# Patient Record
Sex: FEMALE | Race: WHITE | NOT HISPANIC OR LATINO
[De-identification: names, ages, dates, MRNs, and addresses within clinical notes are randomized per-mention and may not be internally consistent; named-entity substitution may affect disease eponyms.]

---

## 2020-01-02 ENCOUNTER — APPOINTMENT (OUTPATIENT)
Dept: PLASTIC SURGERY | Facility: CLINIC | Age: 65
End: 2020-01-02
Payer: COMMERCIAL

## 2020-01-02 VITALS — HEART RATE: 73 BPM | DIASTOLIC BLOOD PRESSURE: 76 MMHG | OXYGEN SATURATION: 97 % | SYSTOLIC BLOOD PRESSURE: 143 MMHG

## 2020-01-02 PROBLEM — Z00.00 ENCOUNTER FOR PREVENTIVE HEALTH EXAMINATION: Status: ACTIVE | Noted: 2020-01-02

## 2020-01-02 PROCEDURE — 99203 OFFICE O/P NEW LOW 30 MIN: CPT

## 2020-01-02 NOTE — HISTORY OF PRESENT ILLNESS
[FreeTextEntry1] : Although the right upper neck mass has been present for about two years, patient notes a recent increase in size.  She denies any history of trauma or infection, She has no history of skin cancer, and no history of prior cysts

## 2020-01-02 NOTE — PHYSICAL EXAM
[NI] : Normal [de-identified] : 1.2 cm firm mobile subcutaneous mass right upper neck\par No surrounding erythema or purulence \par No palpable adenopathy

## 2020-01-02 NOTE — ASSESSMENT
[FreeTextEntry1] : A:\par Epidermal cyst, right upper neck, increasing in size\par P:\par We discussed the treatment options and favor excisional biopsy while the lesion remains relatively small.  Reviewed the material risks,  benefits,  and alternatives with Ms. SANNA KU  , including no surgery, and she  understands and wishes to proceed.\par

## 2020-01-02 NOTE — REVIEW OF SYSTEMS
[Nasal Discharge] : nasal discharge [Emotional Problems] : emotional problems [Negative] : Heme/Lymph [FreeTextEntry4] : sinus pressure [de-identified] : seizures [de-identified] : thyroid problems

## 2020-01-17 ENCOUNTER — APPOINTMENT (OUTPATIENT)
Dept: PLASTIC SURGERY | Facility: CLINIC | Age: 65
End: 2020-01-17
Payer: COMMERCIAL

## 2020-01-17 VITALS
TEMPERATURE: 97.4 F | SYSTOLIC BLOOD PRESSURE: 139 MMHG | HEART RATE: 99 BPM | DIASTOLIC BLOOD PRESSURE: 81 MMHG | RESPIRATION RATE: 20 BRPM | OXYGEN SATURATION: 98 %

## 2020-01-17 PROCEDURE — 11442 EXC FACE-MM B9+MARG 1.1-2 CM: CPT

## 2020-01-17 PROCEDURE — 12051 INTMD RPR FACE/MM 2.5 CM/<: CPT

## 2020-01-17 NOTE — PROCEDURE
[FreeTextEntry1] : cystic lesion in the right upper neck  [FreeTextEntry2] : excisional biopsy of a cystic lesion in the right upper neck  [FreeTextEntry3] : Xylocaine 1% with epinephrine  [FreeTextEntry6] : The lesion was marked for excision with the patient in agreement, and following a sterile prep and drape, Xylocaine with epinephrine was injected for local anesthesia.  Incision was made over the mass which was then  from surrounding tissue and excised.  Hemostasis was assured and the incision closed with nylon sutures.  Patient tolerated well  [FreeTextEntry4] : minimal  [FreeTextEntry5] : none  [FreeTextEntry7] : cystic lesion, neck

## 2020-01-17 NOTE — REASON FOR VISIT
[Procedure: _________] : a [unfilled] procedure visit [FreeTextEntry1] : Patient returns for excisional biopsy of a cystic lesion in the right upper neck

## 2020-01-17 NOTE — ASSESSMENT
[FreeTextEntry1] : A:\par Cystic lesion in the right upper neck \par P:\par Excisional biopsy of a cystic lesion in the right upper neck \par Tolerated well\par Instructions reviewed

## 2020-01-22 ENCOUNTER — APPOINTMENT (OUTPATIENT)
Dept: PLASTIC SURGERY | Facility: CLINIC | Age: 65
End: 2020-01-22
Payer: COMMERCIAL

## 2020-01-22 PROCEDURE — 99024 POSTOP FOLLOW-UP VISIT: CPT

## 2020-01-22 NOTE — REASON FOR VISIT
[FreeTextEntry1] : Ms. SANNA KU  returns for suture removal, generally doing well with no complaints and no fevers or chills at home.  [Post Op: _________] : a [unfilled] post op visit

## 2020-01-22 NOTE — ASSESSMENT
[FreeTextEntry1] : A:\par Doing well post operative \par P;\par Sutures removed and scar care reviewed\par Pathology is pending will call with results

## 2021-01-13 ENCOUNTER — APPOINTMENT (OUTPATIENT)
Dept: PLASTIC SURGERY | Facility: CLINIC | Age: 66
End: 2021-01-13

## 2021-02-19 ENCOUNTER — APPOINTMENT (OUTPATIENT)
Dept: PLASTIC SURGERY | Facility: CLINIC | Age: 66
End: 2021-02-19
Payer: COMMERCIAL

## 2021-02-19 VITALS
TEMPERATURE: 97.8 F | OXYGEN SATURATION: 97 % | SYSTOLIC BLOOD PRESSURE: 142 MMHG | RESPIRATION RATE: 20 BRPM | HEART RATE: 82 BPM | DIASTOLIC BLOOD PRESSURE: 81 MMHG

## 2021-02-19 PROCEDURE — 99212 OFFICE O/P EST SF 10 MIN: CPT

## 2021-02-19 PROCEDURE — 99072 ADDL SUPL MATRL&STAF TM PHE: CPT

## 2021-02-19 NOTE — PHYSICAL EXAM
[de-identified] : 1 cm fimnr mobile mass right mid neck\par no surrounding erythema or purulence\par no palpable adenopathy

## 2021-02-19 NOTE — HISTORY OF PRESENT ILLNESS
[FreeTextEntry1] : Epidermal inclusion cyst excised at the site about 13 months ago, noted recurrent about 6 months ago, held off return due to Covid

## 2021-02-19 NOTE — REASON FOR VISIT
[Consultation] : a consultation visit [FreeTextEntry1] : Patient returns to discuss treatment of a recurrent cystic mass on the right side of the neck

## 2021-02-19 NOTE — ASSESSMENT
[FreeTextEntry1] : A:\par Cystic mass right neck\par P:\par We discussed the treatment options and favor excisional biopsy.  Reviewed the material risks,  benefits,  and alternatives with Ms. SANNA KU  , including no surgery, and she  understands and wishes to proceed.\par

## 2021-03-04 ENCOUNTER — APPOINTMENT (OUTPATIENT)
Dept: PLASTIC SURGERY | Facility: CLINIC | Age: 66
End: 2021-03-04
Payer: COMMERCIAL

## 2021-03-04 VITALS
SYSTOLIC BLOOD PRESSURE: 163 MMHG | DIASTOLIC BLOOD PRESSURE: 84 MMHG | OXYGEN SATURATION: 96 % | HEART RATE: 82 BPM | TEMPERATURE: 98.1 F | RESPIRATION RATE: 19 BRPM

## 2021-03-04 PROCEDURE — 11422 EXC H-F-NK-SP B9+MARG 1.1-2: CPT

## 2021-03-04 PROCEDURE — 12031 INTMD RPR S/A/T/EXT 2.5 CM/<: CPT

## 2021-03-04 PROCEDURE — 99072 ADDL SUPL MATRL&STAF TM PHE: CPT

## 2021-03-04 NOTE — ASSESSMENT
[FreeTextEntry1] : A:\par Cystic mass right neck\par P:\par Excisional biopsy\par closure of 1.6 cm skin defect\par Patient tolerated well\par Instructions reviewed

## 2021-03-04 NOTE — REASON FOR VISIT
[Procedure: _________] : a [unfilled] procedure visit [FreeTextEntry1] : Patient returns for re-excision of the cystic lesion on the right neck

## 2021-03-04 NOTE — PROCEDURE
[FreeTextEntry1] : Cystic mass right upper neck  [FreeTextEntry2] : Excisional biopsy of right neck mass  [FreeTextEntry3] : Xylocaine 1% with epinephrine  [FreeTextEntry4] : 10 cc [FreeTextEntry5] : none  [FreeTextEntry6] : The lesion was marked for excision with the patient in agreement, and Xylocaine with epinephrine injected for local anesthesia.  Following a sterile prep and drape, Xylocaine with epinephrine was injected for local anesthesia.  Incision was made and the mass  from surrounding tissue and excised.  Hemostasis was assured, and the incision closed with nylon sutures.\par Patient tolerated well  [FreeTextEntry7] : cystic mass right neck with overlying skin

## 2021-03-11 ENCOUNTER — APPOINTMENT (OUTPATIENT)
Dept: PLASTIC SURGERY | Facility: CLINIC | Age: 66
End: 2021-03-11
Payer: COMMERCIAL

## 2021-03-11 VITALS
HEART RATE: 72 BPM | OXYGEN SATURATION: 96 % | DIASTOLIC BLOOD PRESSURE: 58 MMHG | SYSTOLIC BLOOD PRESSURE: 146 MMHG | RESPIRATION RATE: 20 BRPM | TEMPERATURE: 98.1 F

## 2021-03-11 DIAGNOSIS — L72.0 EPIDERMAL CYST: ICD-10-CM

## 2021-03-11 PROCEDURE — 99024 POSTOP FOLLOW-UP VISIT: CPT

## 2021-03-11 NOTE — ASSESSMENT
[FreeTextEntry1] : A:\par Doing well\par P:\par Sutures removed and scar care reviewed\par Pathology shows inclusion cyst

## 2021-03-11 NOTE — REASON FOR VISIT
[Post Op: _________] : a [unfilled] post op visit [FreeTextEntry1] : Ms. SANNA KU  returns for suture removal, generally doing well with no complaints and no fevers or chills at home.

## 2023-07-18 ENCOUNTER — TRANSCRIPTION ENCOUNTER (OUTPATIENT)
Age: 68
End: 2023-07-18

## 2023-10-27 ENCOUNTER — TRANSCRIPTION ENCOUNTER (OUTPATIENT)
Age: 68
End: 2023-10-27